# Patient Record
Sex: MALE | Race: ASIAN | NOT HISPANIC OR LATINO
[De-identification: names, ages, dates, MRNs, and addresses within clinical notes are randomized per-mention and may not be internally consistent; named-entity substitution may affect disease eponyms.]

---

## 2023-03-21 ENCOUNTER — APPOINTMENT (OUTPATIENT)
Dept: ORTHOPEDIC SURGERY | Facility: CLINIC | Age: 63
End: 2023-03-21
Payer: COMMERCIAL

## 2023-03-21 DIAGNOSIS — Z78.9 OTHER SPECIFIED HEALTH STATUS: ICD-10-CM

## 2023-03-21 DIAGNOSIS — S63.502A UNSPECIFIED SPRAIN OF LEFT WRIST, INITIAL ENCOUNTER: ICD-10-CM

## 2023-03-21 PROBLEM — Z00.00 ENCOUNTER FOR PREVENTIVE HEALTH EXAMINATION: Status: ACTIVE | Noted: 2023-03-21

## 2023-03-21 PROCEDURE — 99203 OFFICE O/P NEW LOW 30 MIN: CPT

## 2023-03-21 NOTE — HISTORY OF PRESENT ILLNESS
[Sudden] : sudden [7] : 7 [5] : 5 [Sharp] : sharp [Tingling] : tingling [Intermittent] : intermittent [Household chores] : household chores [Leisure] : leisure [Work] : work [Social interactions] : social interactions [Rest] : rest [Full time] : Work status: full time [de-identified] : Patient is here for his left wrist. Patient states he slipped on black ice on 3/2/2023. Patient went to [facility] on 3/7/2023 for x-ray due to having numbness. Patient states he has weakness and sharp pain in his wrist. Patient states he has numbness in his pinky and ring finger since the fall. Patient states he had a previous FX in the left wrist 4 years ago.  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

## 2023-03-21 NOTE — ASSESSMENT
[FreeTextEntry1] : 63 yo RHD male presenting with left wrist sprain. X-rays reviewed from [facility] negative for fractures. Recommend non-surgical management\par -LUE WBAT\par -Activities as tolerated, avoid strenuous/impact related activities\par -Discussed with patient that if he has persistent pain that may order MRI to r/o TFCC injury, patient expressed understanding\par -work note given today excusing patient until Monday, 3/27. patient may return with restrictions of: unable to lift/push/pull anything greater than 10 lbs\par -Rest, ice, compression, elevation, NSAIDs PRN for pain. \par -All questions answered\par -F/u 6 weeks\par

## 2023-03-21 NOTE — PHYSICAL EXAM
[de-identified] : Left Hand:\par INSPECTION: of the hand/wrist is as follows: skin intact without swelling, no abrasions, no lacerations, no erythema\par PALPATION: of the hand/wrist is as follows: Tenderness to TFCC\par RANGE OF MOTION: Wrist dorsiflexion to 50 degrees. Wrist volarflexion to 60 degrees. \par STRENGTH: Wrist dorsiflexion strength is 4/5. Wrist volarflexion strength is 4/5. Grasp strength is 5/5\par NEUROLOGICAL: Motor and sensory function intact in radial, ulnar and median nerve distribution, no focal motor deficits, light touch intact throughout, palpable radial pulse and good capillary refill in all fingers.\par \par X-rays of the left wrist is as follows: outside x-rays reviewed from [facility]\par Wrist 3 View: There are no fractures, subluxations or dislocations. No significant abnormalities are seen.

## 2023-05-05 ENCOUNTER — APPOINTMENT (OUTPATIENT)
Dept: ORTHOPEDIC SURGERY | Facility: CLINIC | Age: 63
End: 2023-05-05

## 2023-11-28 ENCOUNTER — TRANSCRIPTION ENCOUNTER (OUTPATIENT)
Age: 63
End: 2023-11-28

## 2023-11-28 ENCOUNTER — APPOINTMENT (OUTPATIENT)
Dept: ORTHOPEDIC SURGERY | Facility: CLINIC | Age: 63
End: 2023-11-28
Payer: COMMERCIAL

## 2023-11-28 VITALS — BODY MASS INDEX: 24.25 KG/M2 | WEIGHT: 160 LBS | HEIGHT: 68 IN

## 2023-11-28 DIAGNOSIS — M72.2 PLANTAR FASCIAL FIBROMATOSIS: ICD-10-CM

## 2023-11-28 PROCEDURE — 99213 OFFICE O/P EST LOW 20 MIN: CPT

## 2023-11-28 PROCEDURE — 73630 X-RAY EXAM OF FOOT: CPT | Mod: RT

## 2024-01-09 ENCOUNTER — APPOINTMENT (OUTPATIENT)
Dept: ORTHOPEDIC SURGERY | Facility: CLINIC | Age: 64
End: 2024-01-09